# Patient Record
Sex: MALE | URBAN - METROPOLITAN AREA
[De-identification: names, ages, dates, MRNs, and addresses within clinical notes are randomized per-mention and may not be internally consistent; named-entity substitution may affect disease eponyms.]

---

## 2018-08-30 ENCOUNTER — DOCUMENTATION ONLY (OUTPATIENT)
Dept: NEUROLOGY | Facility: CLINIC | Age: 68
End: 2018-08-30

## 2018-08-31 NOTE — PROCEDURES
DATE OF STUDY:  08/30/2018    FACILITY - [facility]    ELECTROENCEPHALOGRAM REPORT    METHODOLOGY:  Electroencephalographic (EEG) recording is recorded with   electrodes placed according to the International 10-20 placement system.  Thirty   two (32) channels of digital signal (sampling rate of 512/sec), including T1   and T2, were simultaneously recorded from the scalp and may include EKG, EMG,   and/or eye monitors.  Recording band pass was 0.1 to 512 Hz.  Digital video   recording of the patient is simultaneously recorded with the EEG.  The patient   is instructed to report clinical symptoms which may occur during the recording   session.  EEG and video recording are stored and archived in digital format.    Activation procedures, which include photic stimulation, hyperventilation and   instructing patients to perform simple tasks, are done in selected patients.    The EEG is displayed on a monitor screen and can be reviewed using different   montages.  Computer assisted-analysis is employed to detect spike and   electrographic seizure activity.  The entire record is submitted for computer   analysis.  The entire recording is visually reviewed, and the times identified   by computer analysis as being spikes or seizures are reviewed again.    Compressed spectral analysis (CSA) is also performed on the activity recorded   from each individual channel.  This is displayed as a power display of   frequencies from 0 to 30 Hz over time.  The CSA is reviewed looking for   asymmetries in power between homologous areas of the scalp, then compared with   the original EEG recording.    [employer] software was also utilized in the review of this study.  This software   suite analyzes the EEG recording in multiple domains.  Coherence and rhythmicity   are computed to identify EEG sections which may contain organized seizures.    Each channel undergoes analysis to detect the presence of spike and sharp waves    which have special and morphological characteristics of epileptic activity.  The   routine EEG recording is converted from special into frequency domain.  This is   then displayed comparing homologous areas to identify areas of significant   asymmetry.  Algorithm to identify non-cortically generated artifact is used to   separate artifact from the EEG.    EEG FINDINGS:  The patient apparently was awake during the recording.  A   somewhat irregular 7 Hz rhythm was noted in the middle and posterior portions of   the head.  There was some intermixed low range beta also noted.  Occasionally,   a 2 Hz to 3 Hz delta was seen diffusely.  The pattern appears to be one of   drowsiness; however, the technologist indicated throughout the recording that   the patient's eyes were open.  Did undergo hyperventilation and there was no   change in the electrocortical activity.  No lateralized or focal features were   noted and no spike or sharp wave activity was seen.  Intermittent photic   stimulation was carried out, which did not alter the recording.    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    CLINICAL CORRELATION:  The patient is a 68-year-old male who had intermittent   shaking in both hands, but the shaking does not occur synchronously.  This lasts   for a few seconds.  He has been dropping objects such as forks.  If he is using   the objects and the shaking starts.  He has not had any episodes of loss of   consciousness.  He does report memory problems and occasional dizziness.  This   recording does show suggestions of a mild encephalopathy without focal changes   nor an epileptic process.      RR/IN  dd: 08/30/2018 16:26:40 (CDT)  td: 08/30/2018 16:45:13 (CDT)  Doc ID   #[id number]  Job ID #[id number]    CC: